# Patient Record
Sex: FEMALE | Race: BLACK OR AFRICAN AMERICAN | NOT HISPANIC OR LATINO | Employment: FULL TIME | URBAN - METROPOLITAN AREA
[De-identification: names, ages, dates, MRNs, and addresses within clinical notes are randomized per-mention and may not be internally consistent; named-entity substitution may affect disease eponyms.]

---

## 2024-02-22 ENCOUNTER — LAB (OUTPATIENT)
Dept: LAB | Facility: LAB | Age: 29
End: 2024-02-22
Payer: COMMERCIAL

## 2024-02-22 ENCOUNTER — OFFICE VISIT (OUTPATIENT)
Dept: OBSTETRICS AND GYNECOLOGY | Facility: CLINIC | Age: 29
End: 2024-02-22
Payer: COMMERCIAL

## 2024-02-22 VITALS
WEIGHT: 249 LBS | HEIGHT: 69 IN | BODY MASS INDEX: 36.88 KG/M2 | DIASTOLIC BLOOD PRESSURE: 90 MMHG | SYSTOLIC BLOOD PRESSURE: 125 MMHG

## 2024-02-22 DIAGNOSIS — Z71.1 CONCERN ABOUT STD IN FEMALE WITHOUT DIAGNOSIS: ICD-10-CM

## 2024-02-22 DIAGNOSIS — Z01.419 WOMEN'S ANNUAL ROUTINE GYNECOLOGICAL EXAMINATION: Primary | ICD-10-CM

## 2024-02-22 LAB
HAV IGM SER QL: NONREACTIVE
HBV CORE IGM SER QL: NONREACTIVE
HBV SURFACE AG SERPL QL IA: NONREACTIVE
HCV AB SER QL: NONREACTIVE
HIV 1+2 AB+HIV1 P24 AG SERPL QL IA: NONREACTIVE
TREPONEMA PALLIDUM IGG+IGM AB [PRESENCE] IN SERUM OR PLASMA BY IMMUNOASSAY: NONREACTIVE

## 2024-02-22 PROCEDURE — 88141 CYTOPATH C/V INTERPRET: CPT | Performed by: PATHOLOGY

## 2024-02-22 PROCEDURE — 87661 TRICHOMONAS VAGINALIS AMPLIF: CPT

## 2024-02-22 PROCEDURE — 87389 HIV-1 AG W/HIV-1&-2 AB AG IA: CPT

## 2024-02-22 PROCEDURE — 1036F TOBACCO NON-USER: CPT | Performed by: OBSTETRICS & GYNECOLOGY

## 2024-02-22 PROCEDURE — 88175 CYTOPATH C/V AUTO FLUID REDO: CPT

## 2024-02-22 PROCEDURE — 36415 COLL VENOUS BLD VENIPUNCTURE: CPT

## 2024-02-22 PROCEDURE — 86780 TREPONEMA PALLIDUM: CPT

## 2024-02-22 PROCEDURE — 87800 DETECT AGNT MULT DNA DIREC: CPT

## 2024-02-22 PROCEDURE — 99385 PREV VISIT NEW AGE 18-39: CPT | Performed by: OBSTETRICS & GYNECOLOGY

## 2024-02-22 PROCEDURE — 80074 ACUTE HEPATITIS PANEL: CPT

## 2024-02-22 NOTE — PROGRESS NOTES
Antonieta Barros is a 28 y.o. female who presents with a chief complaint of Annual Exam and STI Screening      SUBJECTIVE  Annual and std check    Past Medical History:   Diagnosis Date    Acute vaginitis     Vaginal infection    Anemia complicating pregnancy, unspecified trimester     Anemia in pregnancy    Other conditions influencing health status     Menstruation    Other conditions influencing health status     H/O pregnancy    Personal history of other diseases of the respiratory system     Personal history of asthma    Personal history of other specified conditions     History of vomiting    Personal history of other specified conditions     History of nausea    Personal history of other specified conditions     History of headache     No past surgical history on file.  Social History     Socioeconomic History    Marital status: Single     Spouse name: None    Number of children: None    Years of education: None    Highest education level: None   Occupational History    None   Tobacco Use    Smoking status: Never    Smokeless tobacco: Never   Substance and Sexual Activity    Alcohol use: None    Drug use: Never    Sexual activity: None   Other Topics Concern    None   Social History Narrative    None     Social Determinants of Health     Financial Resource Strain: Not on file   Food Insecurity: Not on file   Transportation Needs: Not on file   Physical Activity: Not on file   Stress: Not on file   Social Connections: Not on file   Intimate Partner Violence: Not on file   Housing Stability: Not on file     No family history on file.    OB History   No obstetric history on file.       OBJECTIVE  Not on File   (Not in a hospital admission)       Review of Systems  History obtained from the patient  General ROS: negative  Psychological ROS: negative  Gastrointestinal ROS: negative  Musculoskeletal ROS: negative  Physical Exam  General Appearance: awake, alert, oriented, in no acute distress, well developed, well  "nourished, and in no acute distress  Skin: there are no suspicious lesions or rashes of concern, skin color, texture, turgor are normal; there are no bruises, rashes or lesions.  Head/Face: NCAT  Eyes: No gross abnormalities., PERRL, and EOMI  Neck: neck- supple, no mass, non-tender  Back: no pain to palpation  Abdomen: Soft, non-tender, normal bowel sounds; no bruits, organomegaly or masses.  Extremities: Extremities warm to touch, pink, with no edema.  Musculoskeletal: negative  Urogen: External genitalia: Normal, Vagina: Normal, Cervix: Normal, and Bimanual exam: Normal    /90   Ht 1.753 m (5' 9\")   Wt 113 kg (249 lb)   LMP 02/13/2024   BMI 36.77 kg/m²    Problem List Items Addressed This Visit    None  Visit Diagnoses       Women's annual routine gynecological examination    -  Primary    Relevant Orders    THINPREP PAP    Concern about STD in female without diagnosis        Relevant Orders    THINPREP PAP    Syphilis Screen with Reflex    HIV 1/2 Antigen/Antibody Screen with Reflex to Confirmation    Hepatitis Panel, Acute               "

## 2024-02-24 LAB
C TRACH RRNA SPEC QL NAA+PROBE: NEGATIVE
N GONORRHOEA DNA SPEC QL PROBE+SIG AMP: NEGATIVE
T VAGINALIS RRNA SPEC QL NAA+PROBE: POSITIVE

## 2024-02-24 RX ORDER — METRONIDAZOLE 500 MG/1
500 TABLET ORAL 2 TIMES DAILY
Qty: 10 TABLET | Refills: 0 | Status: SHIPPED | OUTPATIENT
Start: 2024-02-24 | End: 2024-03-03

## 2024-02-27 ENCOUNTER — PHARMACY VISIT (OUTPATIENT)
Dept: PHARMACY | Facility: CLINIC | Age: 29
End: 2024-02-27

## 2024-02-27 PROCEDURE — RXMED WILLOW AMBULATORY MEDICATION CHARGE

## 2024-03-06 LAB
CYTOLOGY CMNT CVX/VAG CYTO-IMP: NORMAL
LAB AP HPV GENOTYPE QUESTION: YES
LAB AP HPV HR: NORMAL
LAB AP PAP ADDITIONAL TESTS: NORMAL
LABORATORY COMMENT REPORT: NORMAL
LMP START DATE: NORMAL
PATH REPORT.TOTAL CANCER: NORMAL

## 2024-06-03 ENCOUNTER — PHARMACY VISIT (OUTPATIENT)
Dept: PHARMACY | Facility: CLINIC | Age: 29
End: 2024-06-03
Payer: MEDICAID

## 2024-06-03 PROCEDURE — RXOTC WILLOW AMBULATORY OTC CHARGE

## 2024-06-03 PROCEDURE — RXMED WILLOW AMBULATORY MEDICATION CHARGE

## 2024-06-03 RX ORDER — AMOXICILLIN 500 MG/1
500 CAPSULE ORAL
Qty: 21 CAPSULE | Refills: 0 | OUTPATIENT
Start: 2024-03-20

## 2024-09-28 ENCOUNTER — APPOINTMENT (OUTPATIENT)
Dept: URGENT CARE | Age: 29
End: 2024-09-28
Payer: COMMERCIAL

## 2024-10-08 ENCOUNTER — OFFICE VISIT (OUTPATIENT)
Dept: URGENT CARE | Age: 29
End: 2024-10-08
Payer: COMMERCIAL

## 2024-10-08 ENCOUNTER — PHARMACY VISIT (OUTPATIENT)
Dept: PHARMACY | Facility: CLINIC | Age: 29
End: 2024-10-08
Payer: MEDICAID

## 2024-10-08 VITALS
OXYGEN SATURATION: 99 % | HEART RATE: 99 BPM | SYSTOLIC BLOOD PRESSURE: 108 MMHG | RESPIRATION RATE: 22 BRPM | TEMPERATURE: 98 F | BODY MASS INDEX: 37.8 KG/M2 | DIASTOLIC BLOOD PRESSURE: 68 MMHG | WEIGHT: 256 LBS

## 2024-10-08 DIAGNOSIS — R05.1 ACUTE COUGH: ICD-10-CM

## 2024-10-08 DIAGNOSIS — J45.21 MILD INTERMITTENT ASTHMA WITH ACUTE EXACERBATION (HHS-HCC): Primary | ICD-10-CM

## 2024-10-08 DIAGNOSIS — R06.02 SHORTNESS OF BREATH: ICD-10-CM

## 2024-10-08 LAB — POC SARS-COV-2 AG BINAX: NORMAL

## 2024-10-08 PROCEDURE — RXMED WILLOW AMBULATORY MEDICATION CHARGE

## 2024-10-08 RX ORDER — IPRATROPIUM BROMIDE AND ALBUTEROL SULFATE 2.5; .5 MG/3ML; MG/3ML
3 SOLUTION RESPIRATORY (INHALATION) ONCE
Status: COMPLETED | OUTPATIENT
Start: 2024-10-08 | End: 2024-10-08

## 2024-10-08 RX ORDER — DOXYCYCLINE 100 MG/1
100 CAPSULE ORAL 2 TIMES DAILY
Qty: 10 CAPSULE | Refills: 0 | Status: SHIPPED | OUTPATIENT
Start: 2024-10-08 | End: 2024-10-13

## 2024-10-08 RX ORDER — TRIAMCINOLONE ACETONIDE 40 MG/ML
40 INJECTION, SUSPENSION INTRA-ARTICULAR; INTRAMUSCULAR ONCE
Status: COMPLETED | OUTPATIENT
Start: 2024-10-08 | End: 2024-10-08

## 2024-10-08 RX ORDER — ALBUTEROL SULFATE 90 UG/1
2 INHALANT RESPIRATORY (INHALATION) EVERY 6 HOURS PRN
Qty: 18 G | Refills: 0 | Status: SHIPPED | OUTPATIENT
Start: 2024-10-08 | End: 2025-10-08

## 2024-10-08 ASSESSMENT — ENCOUNTER SYMPTOMS
SHORTNESS OF BREATH: 1
COUGH: 1

## 2024-10-08 ASSESSMENT — PATIENT HEALTH QUESTIONNAIRE - PHQ9
2. FEELING DOWN, DEPRESSED OR HOPELESS: NOT AT ALL
SUM OF ALL RESPONSES TO PHQ9 QUESTIONS 1 AND 2: 0
1. LITTLE INTEREST OR PLEASURE IN DOING THINGS: NOT AT ALL

## 2024-10-08 ASSESSMENT — PAIN SCALES - GENERAL: PAINLEVEL: 8

## 2024-10-08 NOTE — PATIENT INSTRUCTIONS
Go to the emergency department for severe symptoms.    Follow-up with primary care preferably within the next 2 weeks.  Request for inhaled steroid prescription as it requires prior authorization.    Plain saline nasal spray, drops or wash every 2-4 hours to rinse the nasal passages followed with warm saltwater or mouthwash gargles.    Try not to smoke as discussed.  Taper off to discontinue as best as you can.    You received a long-acting injectable steroid here.  No oral steroids needed.

## 2024-10-08 NOTE — LETTER
October 8, 2024     Patient: Antonieta Barros   YOB: 1995   Date of Visit: 10/8/2024       To Whom It May Concern:    Antonieta Barros was seen in my clinic on 10/8/2024 at 8:55 am. Please excuse Antonieta for her absence from work on this day to make the appointment. She may return to work on Wednesday 10/09/2024    If you have any questions or concerns, please don't hesitate to call.         Sincerely,         Kavita Roberto        CC: No Recipients

## 2024-10-08 NOTE — PROGRESS NOTES
Subjective   Patient ID: Antonieta Barros is a 29 y.o. female. They present today with a chief complaint of Cough (Started yesterday ) and Shortness of Breath (Asthma tightness in chest HA this AM ).    History of Present Illness    Cough  Associated symptoms include shortness of breath.   Shortness of Breath  Associated symptoms: cough        As noted above with correction.  Symptoms ongoing for 2 and half days. History of asthma currently no meds / own inhalers. Symptoms worsened last night - using son's inhaler 4 puffs ~every hour, last used around 7:25 am.  Patient did not find relief from this.  Patient has felt feverish at night since onset of symptoms.      Past Medical History  Allergies as of 10/08/2024    (No Known Allergies)       (Not in a hospital admission)       Past Medical History:   Diagnosis Date    Acute vaginitis     Vaginal infection    Anemia complicating pregnancy, unspecified trimester ([facility])     Anemia in pregnancy    Other conditions influencing health status     Menstruation    Other conditions influencing health status     H/O pregnancy    Personal history of other diseases of the respiratory system     Personal history of asthma    Personal history of other specified conditions     History of vomiting    Personal history of other specified conditions     History of nausea    Personal history of other specified conditions     History of headache       No past surgical history on file.     reports that she has been smoking cigarettes. She has never used smokeless tobacco. She reports that she does not use drugs.    Review of Systems  Review of Systems   Respiratory:  Positive for cough and shortness of breath.                                   Objective    Vitals:    10/08/24 0845 10/08/24 0933   BP: 108/68    Pulse: 97 99   Resp: 24 22   Temp: 36.7 °C (98 °F)    SpO2: 97% 99%   Weight: 116 kg (256 lb)      No LMP recorded.    Physical Exam      Constitutional: Vital signs reviewed.  Well developed and well nourished. Patient alert and with mild distress.     Head and Face: Normal, no orbital swelling.     Eyes: Pupils and irises normal. Pink conjunctivae, anicteric sclerae. No conjunctival injection.    Ears, Nose, Mouth and Throat: External inspection of ears: Normal. Otoscopic exam: Normal. Nasal Mucosa, septum and turbinates: Abnormal +rhinorrhea, +mildly swollen turbinates. Oropharynx: +postnasal drainage. Normal oropharynx otherwise    Neck: No neck mass observed. Supple.    Cardiovascular: Heart rate normal, normal S1 and S2, no gallops, no murmurs and no pericardial rub. Rhythm: Normal. No peripheral edema.    Pulmonary: Mild tachypnea with some breathlessness reduced but clear breath sounds.  No stridor.    Neurologic: Cortical function: Normal    Lymphatic: No cervical lymphadenopathy        Procedures    Point of Care Test & Imaging Results from this visit  Results for orders placed or performed in visit on 10/08/24   POCT Covid-19 Rapid Antigen   Result Value Ref Range    POC DEEPTHI-COV-2 AG  Presumptive negative test for SARS-CoV-2 (no antigen detected)     Presumptive negative test for SARS-CoV-2 (no antigen detected)      No results found.    Diagnostic study results (if any) were reviewed by Kavita Roberto.    Assessment/Plan   Allergies, medications, history, and pertinent labs/EKGs/Imaging reviewed by Kavita Roberto.     Medical Decision Making  After the DuoNeb treatment, patient felt worse, still very short of breath and started hyperventilating.  Patient was instructed to breathing the bag to reinhale her carbon dioxide.  Patient did not feel that the DuoNeb helped.  On repeat lung exam, there was better air movement and now with a few audible wheezes.  We discussed pros and cons of triamcinolone injection, patient agrees.  We observed her for several more minutes.  She felt improved on my recheck.  Not hyperventilating.  Lung sounds are clear.      Orders  and Diagnoses  Diagnoses and all orders for this visit:  Mild intermittent asthma with acute exacerbation ([facility])  -     ipratropium-albuteroL (Duo-Neb) 0.5-2.5 mg/3 mL nebulizer solution 3 mL  -     triamcinolone acetonide (Kenalog-40) injection 40 mg  -     doxycycline (Vibramycin) 100 mg capsule; Take 1 capsule (100 mg) by mouth 2 times a day for 5 days. Take with at least 8 ounces (large glass) of water, do not lie down for 30 minutes after  -     albuterol 90 mcg/actuation inhaler; Inhale 2 puffs every 6 hours if needed for wheezing.  Acute cough  -     POCT Covid-19 Rapid Antigen  -     doxycycline (Vibramycin) 100 mg capsule; Take 1 capsule (100 mg) by mouth 2 times a day for 5 days. Take with at least 8 ounces (large glass) of water, do not lie down for 30 minutes after  -     albuterol 90 mcg/actuation inhaler; Inhale 2 puffs every 6 hours if needed for wheezing.  Shortness of breath  -     doxycycline (Vibramycin) 100 mg capsule; Take 1 capsule (100 mg) by mouth 2 times a day for 5 days. Take with at least 8 ounces (large glass) of water, do not lie down for 30 minutes after  -     albuterol 90 mcg/actuation inhaler; Inhale 2 puffs every 6 hours if needed for wheezing.      Medical Admin Record  Administrations This Visit       ipratropium-albuteroL (Duo-Neb) 0.5-2.5 mg/3 mL nebulizer solution 3 mL       Admin Date  10/08/2024 Action  Given Dose  3 mL Route  nebulization Documented By  Dariana Gleason MA              triamcinolone acetonide (Kenalog-40) injection 40 mg       Admin Date  10/08/2024 Action  Given Dose  40 mg Route  intramuscular Documented By  Salome Stokes RN                    Patient disposition: Home    Electronically signed by Kavita Roberto  8:58 PM